# Patient Record
Sex: MALE | Race: BLACK OR AFRICAN AMERICAN | Employment: FULL TIME | URBAN - METROPOLITAN AREA
[De-identification: names, ages, dates, MRNs, and addresses within clinical notes are randomized per-mention and may not be internally consistent; named-entity substitution may affect disease eponyms.]

---

## 2021-05-12 ENCOUNTER — APPOINTMENT (OUTPATIENT)
Dept: CT IMAGING | Facility: HOSPITAL | Age: 36
End: 2021-05-12
Attending: STUDENT IN AN ORGANIZED HEALTH CARE EDUCATION/TRAINING PROGRAM
Payer: COMMERCIAL

## 2021-05-12 ENCOUNTER — HOSPITAL ENCOUNTER (OUTPATIENT)
Facility: HOSPITAL | Age: 36
Setting detail: OBSERVATION
Discharge: HOME | End: 2021-05-14
Attending: STUDENT IN AN ORGANIZED HEALTH CARE EDUCATION/TRAINING PROGRAM | Admitting: HOSPITALIST
Payer: COMMERCIAL

## 2021-05-12 ENCOUNTER — APPOINTMENT (OUTPATIENT)
Dept: GENERAL RADIOLOGY | Facility: HOSPITAL | Age: 36
End: 2021-05-12
Attending: STUDENT IN AN ORGANIZED HEALTH CARE EDUCATION/TRAINING PROGRAM
Payer: COMMERCIAL

## 2021-05-12 DIAGNOSIS — R10.13 EPIGASTRIC ABDOMINAL PAIN: ICD-10-CM

## 2021-05-12 DIAGNOSIS — K92.2 GASTROINTESTINAL HEMORRHAGE, UNSPECIFIED GASTROINTESTINAL HEMORRHAGE TYPE: Primary | ICD-10-CM

## 2021-05-12 DIAGNOSIS — R19.5 DARK STOOLS: ICD-10-CM

## 2021-05-12 DIAGNOSIS — D64.9 ANEMIA, UNSPECIFIED TYPE: ICD-10-CM

## 2021-05-12 DIAGNOSIS — S09.90XA INJURY OF HEAD, INITIAL ENCOUNTER: ICD-10-CM

## 2021-05-12 DIAGNOSIS — R55 SYNCOPE AND COLLAPSE: ICD-10-CM

## 2021-05-12 PROCEDURE — 71045 X-RAY EXAM CHEST 1 VIEW: CPT | Performed by: STUDENT IN AN ORGANIZED HEALTH CARE EDUCATION/TRAINING PROGRAM

## 2021-05-12 PROCEDURE — 99220 INITIAL OBSERVATION CARE,LEVL III: CPT | Performed by: HOSPITALIST

## 2021-05-12 PROCEDURE — 70450 CT HEAD/BRAIN W/O DYE: CPT | Performed by: STUDENT IN AN ORGANIZED HEALTH CARE EDUCATION/TRAINING PROGRAM

## 2021-05-12 RX ORDER — POLYETHYLENE GLYCOL 3350 17 G/17G
17 POWDER, FOR SOLUTION ORAL DAILY PRN
Status: DISCONTINUED | OUTPATIENT
Start: 2021-05-12 | End: 2021-05-14

## 2021-05-12 RX ORDER — ACETAMINOPHEN 325 MG/1
650 TABLET ORAL EVERY 6 HOURS PRN
Status: DISCONTINUED | OUTPATIENT
Start: 2021-05-12 | End: 2021-05-14

## 2021-05-12 RX ORDER — BISACODYL 10 MG
10 SUPPOSITORY, RECTAL RECTAL
Status: DISCONTINUED | OUTPATIENT
Start: 2021-05-12 | End: 2021-05-14

## 2021-05-12 RX ORDER — ONDANSETRON 2 MG/ML
8 INJECTION INTRAMUSCULAR; INTRAVENOUS EVERY 6 HOURS PRN
Status: DISCONTINUED | OUTPATIENT
Start: 2021-05-12 | End: 2021-05-14

## 2021-05-12 RX ORDER — CALCIUM CARBONATE 200(500)MG
1 TABLET,CHEWABLE ORAL DAILY PRN
Status: ON HOLD | COMMUNITY
End: 2021-05-14

## 2021-05-12 RX ORDER — CALCIUM CARBONATE 200(500)MG
500 TABLET,CHEWABLE ORAL DAILY PRN
Status: DISCONTINUED | OUTPATIENT
Start: 2021-05-12 | End: 2021-05-14

## 2021-05-12 RX ORDER — MELATONIN
3 NIGHTLY PRN
Status: DISCONTINUED | OUTPATIENT
Start: 2021-05-12 | End: 2021-05-14

## 2021-05-12 RX ORDER — SODIUM CHLORIDE 9 MG/ML
INJECTION, SOLUTION INTRAVENOUS CONTINUOUS
Status: DISCONTINUED | OUTPATIENT
Start: 2021-05-12 | End: 2021-05-14

## 2021-05-12 RX ORDER — PANTOPRAZOLE SODIUM 40 MG/1
INJECTION, POWDER, FOR SOLUTION INTRAVENOUS
Status: COMPLETED
Start: 2021-05-12 | End: 2021-05-12

## 2021-05-12 NOTE — ED PROVIDER NOTES
Patient Seen in: [facility] Emergency Department      History   Patient presents with:  Syncope    Stated Complaint: syncopal episode     HPI/Subjective:   HPI    Patient is a 42-year-old male who presents emergency department for evaluation followi (Temporal)   Resp 16   Ht 170.2 cm (5' 7\")   Wt 97.5 kg   SpO2 100%   BMI 33.67 kg/m²         Physical Exam    Constitutional: The patient is oriented to person, place, and time, appears well-developed and well-nourished. HENT:   Head: Normocephalic. Neutrophil Absolute Prelim 7.84 (*)     Neutrophil Absolute 7.84 (*)     Lymphocyte Absolute 4.02 (*)     All other components within normal limits   TROPONIN I - Normal   D-DIMER - Normal   PROTHROMBIN TIME (PT) - Normal   RAPID SARS-COV-2 BY PCR - Normal admit to having had melanotic stools intermittently for the last couple of months. No vomiting, no abdominal discomfort, no fever.     CT of the brain was obtained showing no acute intracranial abnormality given his head injury in the setting of the syncop

## 2021-05-12 NOTE — PLAN OF CARE
Pt admitted via cart from ER. Pt alert and oriented. Admission database completed. Pt denies pain other than mild headache from \"not eating all day\"; pt states head where he hit it when he fell does not bother him and sm bump per pt. Pt denies nausea.  Pt

## 2021-05-12 NOTE — CONSULTS
[facility]                       Gastroenterology [address] [facility] Gastroenterology    Aretha Zurita Patient Status:  Emergency    1985 MRN [id number]   Location [address] [facility] Attending Crissy Kapoor, disease    ROS:  In addition to the pertinent positives described above:             Infectious Disease:  No chronic infections or recent fevers prior to the acute illness            Cardiovascular: No history of CAD, prior MI, chest pain, or palpitations cyanosis    Skin: Warm and dry    Psychiatric: Appropriate mood and congruent affect without obvious depression or anxiety    Labs:   Lab Results   Component Value Date    WBC 13.1 05/12/2021    HGB 7.4 05/12/2021    HCT 22.3 05/12/2021    .0 05/12/ in AM transfusing to maintain Hgb >7    Thank you for the consultation, we will follow the patient with you.     CHAYITO Mckee  3:04 PM  5/12/2021  [facility] Gastroenterology  [phone]    GI attending addendum:    I have personally seen and examin

## 2021-05-12 NOTE — ED INITIAL ASSESSMENT (HPI)
Patient to ED for syncopal episode this morning after urinating. Patient states was urinating, felt hot and dizzy, sad down, stood up and then lost consciousness/fell. Patient states woke up to his roommate yelling his name.  C/o pain/bump to right side gucci

## 2021-05-12 NOTE — H&P
[facility] HOSPITALIST  History and Physical     Etelvina [place] Patient Status:  Observation    1985 MRN [id number]   [facility] 0SW-A Attending Ubaldo Freeman MD   Hosp Day # 0 PCP None Pcp     Chief Complaint: syncope    History of Present organomegaly. Neurologic: No focal neurological deficits. CNII-XII grossly intact. Musculoskeletal: Moves all extremities. Extremities: No edema or cyanosis. Integument: No rashes or lesions. Psychiatric: Appropriate mood and affect.       Diagnostic

## 2021-05-13 ENCOUNTER — ANESTHESIA EVENT (OUTPATIENT)
Dept: ENDOSCOPY | Facility: HOSPITAL | Age: 36
End: 2021-05-13
Payer: COMMERCIAL

## 2021-05-13 ENCOUNTER — ANESTHESIA (OUTPATIENT)
Dept: ENDOSCOPY | Facility: HOSPITAL | Age: 36
End: 2021-05-13
Payer: COMMERCIAL

## 2021-05-13 PROCEDURE — 30233N1 TRANSFUSION OF NONAUTOLOGOUS RED BLOOD CELLS INTO PERIPHERAL VEIN, PERCUTANEOUS APPROACH: ICD-10-PCS | Performed by: INTERNAL MEDICINE

## 2021-05-13 PROCEDURE — 99225 SUBSEQUENT OBSERVATION CARE: CPT | Performed by: HOSPITALIST

## 2021-05-13 PROCEDURE — 0DB78ZX EXCISION OF STOMACH, PYLORUS, VIA NATURAL OR ARTIFICIAL OPENING ENDOSCOPIC, DIAGNOSTIC: ICD-10-PCS | Performed by: INTERNAL MEDICINE

## 2021-05-13 RX ORDER — LIDOCAINE HYDROCHLORIDE 10 MG/ML
INJECTION, SOLUTION EPIDURAL; INFILTRATION; INTRACAUDAL; PERINEURAL AS NEEDED
Status: DISCONTINUED | OUTPATIENT
Start: 2021-05-13 | End: 2021-05-13 | Stop reason: SURG

## 2021-05-13 RX ORDER — METOCLOPRAMIDE HYDROCHLORIDE 5 MG/ML
10 INJECTION INTRAMUSCULAR; INTRAVENOUS AS NEEDED
Status: ACTIVE | OUTPATIENT
Start: 2021-05-13 | End: 2021-05-14

## 2021-05-13 RX ORDER — SODIUM CHLORIDE 9 MG/ML
INJECTION, SOLUTION INTRAVENOUS CONTINUOUS
Status: ACTIVE | OUTPATIENT
Start: 2021-05-13 | End: 2021-05-13

## 2021-05-13 RX ORDER — HYDROMORPHONE HYDROCHLORIDE 1 MG/ML
0.4 INJECTION, SOLUTION INTRAMUSCULAR; INTRAVENOUS; SUBCUTANEOUS EVERY 5 MIN PRN
Status: ACTIVE | OUTPATIENT
Start: 2021-05-13 | End: 2021-05-14

## 2021-05-13 RX ORDER — DIPHENHYDRAMINE HYDROCHLORIDE 50 MG/ML
12.5 INJECTION INTRAMUSCULAR; INTRAVENOUS AS NEEDED
Status: ACTIVE | OUTPATIENT
Start: 2021-05-13 | End: 2021-05-14

## 2021-05-13 RX ORDER — NALOXONE HYDROCHLORIDE 0.4 MG/ML
80 INJECTION, SOLUTION INTRAMUSCULAR; INTRAVENOUS; SUBCUTANEOUS AS NEEDED
Status: ACTIVE | OUTPATIENT
Start: 2021-05-13 | End: 2021-05-14

## 2021-05-13 RX ORDER — SODIUM CHLORIDE 9 MG/ML
INJECTION, SOLUTION INTRAVENOUS ONCE
Status: COMPLETED | OUTPATIENT
Start: 2021-05-13 | End: 2021-05-13

## 2021-05-13 RX ORDER — ONDANSETRON 2 MG/ML
4 INJECTION INTRAMUSCULAR; INTRAVENOUS AS NEEDED
Status: ACTIVE | OUTPATIENT
Start: 2021-05-13 | End: 2021-05-14

## 2021-05-13 RX ORDER — PANTOPRAZOLE SODIUM 40 MG/1
40 TABLET, DELAYED RELEASE ORAL
Status: DISCONTINUED | OUTPATIENT
Start: 2021-05-13 | End: 2021-05-14

## 2021-05-13 RX ORDER — SODIUM CHLORIDE, SODIUM LACTATE, POTASSIUM CHLORIDE, CALCIUM CHLORIDE 600; 310; 30; 20 MG/100ML; MG/100ML; MG/100ML; MG/100ML
INJECTION, SOLUTION INTRAVENOUS CONTINUOUS
Status: DISCONTINUED | OUTPATIENT
Start: 2021-05-13 | End: 2021-05-14

## 2021-05-13 RX ORDER — DEXTROSE MONOHYDRATE 25 G/50ML
50 INJECTION, SOLUTION INTRAVENOUS
Status: DISCONTINUED | OUTPATIENT
Start: 2021-05-13 | End: 2021-05-14

## 2021-05-13 RX ADMIN — SODIUM CHLORIDE: 9 INJECTION, SOLUTION INTRAVENOUS at 15:39:00

## 2021-05-13 RX ADMIN — LIDOCAINE HYDROCHLORIDE 50 MG: 10 INJECTION, SOLUTION EPIDURAL; INFILTRATION; INTRACAUDAL; PERINEURAL at 15:29:00

## 2021-05-13 NOTE — PROGRESS NOTES
[facility] H[facility]PITAL[facility]  Progress note     Pio Abrams Patient Status:  Observation    1985 MRN [id number]   [facility] 0SW-A Attending Milagros Blanchard MD   Hosp Day # 0 PCP None Pcp     Chief Complaint: syncope  Subjective: patient feeling 05/12/21  1134 05/12/21  1354   PREMA 45.0*  44.2*  --    DDIMER  --  <0.27       Imaging: Imaging data reviewed in Epic. ASSESSMENT / PLAN:     1. Syncope likely due to symptomatic anemia and vasovagal event  2.  Anemia, likely due to gi blood loss; LINDA

## 2021-05-13 NOTE — PLAN OF CARE
Assumed care of pt at [address] [facility]. Pt denies pain. Lungs clear. Abdomen soft non tender bs present. Pt npo post mn for procedure in am. Remains on protonix gtt and ivf. Will continue to monitor.

## 2021-05-13 NOTE — ANESTHESIA POSTPROCEDURE EVALUATION
[facility] Patient Status:  Observation   Age/Gender 37year old male MRN [id number]   Location [address] [place] [address] Attending Latia Higuera MD   Hosp Day # 0 PCP None Pcp       Anesthesia Post-op Note    ESOPHAGOGASTRO

## 2021-05-13 NOTE — ANESTHESIA PREPROCEDURE EVALUATION
PRE-OP EVALUATION    Patient Name: Lindsey David    Admit Diagnosis: Syncope and collapse [R55]  Injury of head, initial encounter [S09.90XA]  Gastrointestinal hemorrhage, unspecified gastrointestinal hemorrhage type [K92.2]  Anemia, unspecified type [D64.9] 5/11/2021 at Unknown time        Allergies: Patient has no known allergies. Anesthesia Evaluation    Patient summary reviewed.     Anesthetic Complications  (-) history of anesthetic complications         GI/Hepatic/Renal  Comment: GI bleed

## 2021-05-13 NOTE — OPERATIVE REPORT
Operative Report-Esophagogastroduodenoscopy with biopsy      PREOPERATIVE DIAGNOSIS/INDICATION:  1. Melena  2.  Acute blood loss anemia  POSTOPERTATIVE DIAGNOSIS:  1. Multiple clean based duodenal ulcers with deformity of the bulb bid  - Soft diet  - If tolerating and hemoglobin stable, ok for dc in am  - OV in 4- 6 weeks.   CC Report:     Marianela Lebron  5/13/2021  3:38 PM  None Pcp

## 2021-05-13 NOTE — PLAN OF CARE
Assumed care at 0730  A&Ox4  Denies dizziness  Denies pain  1 unit PRBCs infusing  Vitals stable  Kept NPO for EGD this afternoon  Will continue to monitor

## 2021-05-14 VITALS
BODY MASS INDEX: 33.74 KG/M2 | SYSTOLIC BLOOD PRESSURE: 114 MMHG | OXYGEN SATURATION: 100 % | HEART RATE: 68 BPM | RESPIRATION RATE: 17 BRPM | TEMPERATURE: 98 F | HEIGHT: 67 IN | DIASTOLIC BLOOD PRESSURE: 61 MMHG | WEIGHT: 215 LBS

## 2021-05-14 PROCEDURE — 99217 OBSERVATION CARE DISCHARGE: CPT | Performed by: HOSPITALIST

## 2021-05-14 RX ORDER — MELATONIN
325
Qty: 30 TABLET | Refills: 11 | Status: SHIPPED | OUTPATIENT
Start: 2021-05-14

## 2021-05-14 RX ORDER — PANTOPRAZOLE SODIUM 40 MG/1
40 TABLET, DELAYED RELEASE ORAL
Qty: 60 TABLET | Refills: 2 | Status: SHIPPED | OUTPATIENT
Start: 2021-05-14

## 2021-05-14 RX ORDER — RIZATRIPTAN BENZOATE 5 MG/1
5 TABLET, ORALLY DISINTEGRATING ORAL ONCE
Status: COMPLETED | OUTPATIENT
Start: 2021-05-14 | End: 2021-05-14

## 2021-05-14 NOTE — PLAN OF CARE
Headache pain decreased from 7/10 to 2/10 after taking maxalt. Tolerating soft diet, c/o some bloating after eating that has since passed. Belching and passing flatus. Cleared for discharge home today.  Written and verbal discharge education provided for pt

## 2021-05-14 NOTE — PROGRESS NOTES
[facility]                       Gastroenterology Follow up [address] [facility] Gastroenterology    Katelynn Bullock Patient Status:  Observation    1985 MRN [id number]   [facility] 0SW-A Attending Mayuri Parekh MD   [address] [facility] Day # 0 PC bleed secondary to multiple gastroduodenal ulcers with duodenal bulb deformity. Suspect H.pylori+ NSAIDs  Plan:   1. OK for dc today on PPI bid, iron supplements  2. Will fu on path as outpatient and treat if positive  3. Avoid NSAIDs  4.  Fu in office in 4

## 2021-05-14 NOTE — DISCHARGE SUMMARY
[facility]IATR[facility] [place] HOSPITALIST  DISCHARGE SUMMARY     Veronika Cuellar Patient Status:  Observation    1985 MRN [id number]   [facility] 0SW-A Attending No att. providers found   Hosp Day # 0 PCP None Pcp     Date of Admission: 2021  Date of [address] (two) times daily before meals.    Quantity: 60 tablet  Refills: 2        STOP taking these medications    Calcium Carbonate Antacid 500 MG Chew  Commonly known as: TUMS              Where to Get Your Medications      Please  your prescriptions at th

## 2021-05-14 NOTE — PLAN OF CARE
Assumed care at 1710 Pradeep Zheng pt on bed  Headache better with Tylenol. Denies dizziness  POC updated. Vital signs stable. Tolerating diet,will advance.   Check Hgb in am.  Problem: Patient/Family Goals  Goal: Patient/Family Long Term Goal  Description: Mare Vazquez Administer electrolyte replacement as ordered  - Monitor response to electrolyte replacements, including rhythm and repeat lab results as appropriate  - Fluid restriction as ordered  - Instruct patient on fluid and nutrition restrictions as appropriate  Ou

## 2021-05-17 ENCOUNTER — PATIENT OUTREACH (OUTPATIENT)
Dept: CASE MANAGEMENT | Age: 36
End: 2021-05-17

## 2021-05-17 ENCOUNTER — APPOINTMENT (OUTPATIENT)
Dept: GENERAL RADIOLOGY | Facility: HOSPITAL | Age: 36
End: 2021-05-17
Attending: EMERGENCY MEDICINE
Payer: COMMERCIAL

## 2021-05-17 ENCOUNTER — HOSPITAL ENCOUNTER (EMERGENCY)
Facility: HOSPITAL | Age: 36
Discharge: HOME | End: 2021-05-17
Attending: EMERGENCY MEDICINE
Payer: COMMERCIAL

## 2021-05-17 VITALS
DIASTOLIC BLOOD PRESSURE: 77 MMHG | BODY MASS INDEX: 33.9 KG/M2 | RESPIRATION RATE: 12 BRPM | WEIGHT: 216 LBS | HEART RATE: 69 BPM | OXYGEN SATURATION: 98 % | SYSTOLIC BLOOD PRESSURE: 155 MMHG | HEIGHT: 67 IN | TEMPERATURE: 98 F

## 2021-05-17 DIAGNOSIS — E87.6 HYPOKALEMIA: ICD-10-CM

## 2021-05-17 DIAGNOSIS — Z02.9 ENCOUNTERS FOR ADMINISTRATIVE PURPOSE: ICD-10-CM

## 2021-05-17 DIAGNOSIS — K27.9 PUD (PEPTIC ULCER DISEASE): Primary | ICD-10-CM

## 2021-05-17 PROCEDURE — 83690 ASSAY OF LIPASE: CPT | Performed by: EMERGENCY MEDICINE

## 2021-05-17 PROCEDURE — 80053 COMPREHEN METABOLIC PANEL: CPT | Performed by: EMERGENCY MEDICINE

## 2021-05-17 PROCEDURE — 82272 OCCULT BLD FECES 1-3 TESTS: CPT

## 2021-05-17 PROCEDURE — 99284 EMERGENCY DEPT VISIT MOD MDM: CPT

## 2021-05-17 PROCEDURE — 71045 X-RAY EXAM CHEST 1 VIEW: CPT | Performed by: EMERGENCY MEDICINE

## 2021-05-17 PROCEDURE — 93005 ELECTROCARDIOGRAM TRACING: CPT

## 2021-05-17 PROCEDURE — 36415 COLL VENOUS BLD VENIPUNCTURE: CPT

## 2021-05-17 PROCEDURE — 84484 ASSAY OF TROPONIN QUANT: CPT | Performed by: EMERGENCY MEDICINE

## 2021-05-17 PROCEDURE — 85025 COMPLETE CBC W/AUTO DIFF WBC: CPT | Performed by: EMERGENCY MEDICINE

## 2021-05-17 PROCEDURE — 93010 ELECTROCARDIOGRAM REPORT: CPT

## 2021-05-17 RX ORDER — POTASSIUM CHLORIDE 20 MEQ/1
40 TABLET, EXTENDED RELEASE ORAL ONCE
Status: COMPLETED | OUTPATIENT
Start: 2021-05-17 | End: 2021-05-17

## 2021-05-17 NOTE — ED INITIAL ASSESSMENT (HPI)
38 yo M, whop had hx of bleeding ulcer, had recent blood transfusion brought by EMS complaining of epigastric/chest tightness and difficulty of breathing after eating peanut butter and jelly.  He states when he sleeps if feels like he stopped breathing and

## 2021-05-17 NOTE — ED PROVIDER NOTES
Patient Seen in: [facility] Emergency Department      History   Patient presents with:   Anxiety/Panic attack    Stated Complaint: anxiety, chest discomfort    HPI/Subjective:   HPI    68-year-old gentleman just discharged from the hospital on Friday Resp 12   Ht 170.2 cm (5' 7\")   Wt 98 kg   SpO2 98%   BMI 33.83 kg/m²         Physical Exam  Vitals and nursing note reviewed. Constitutional:       General: He is not in acute distress. Appearance: He is well-developed. He is not toxic-appearing. DIFFERENTIAL[[id number]]          Abnormal            Final result                 Please view results for these tests on the individual orders.    RAINBOW DRAW LAVENDER   RAINBOW DRAW LIGHT GREEN   RAINBOW DRAW GOLD     AP CHEST RADIOGRAPH at 2:03 AM    Com be discharged home with outpatient follow-up.   Advised to continue current regimen for PUD                     Disposition and Plan     Clinical Impression:  PUD (peptic ulcer disease)  (primary encounter diagnosis)  Hypokalemia     Disposition:  Discharge

## 2021-05-17 NOTE — PAYOR COMM NOTE
Discharge Notification    Patient Name: Estefani Garvin: Gasper Helms  #: [id number]  Authorization Number:  [id number]  Admit Date/Time: 5/12/2021 10:39 AM  Discharge Date/Time: 5/14/2021 1:15 PM

## 2021-05-18 ENCOUNTER — OFFICE VISIT (OUTPATIENT)
Dept: INTERNAL MEDICINE CLINIC | Facility: CLINIC | Age: 36
End: 2021-05-18
Payer: COMMERCIAL

## 2021-05-18 VITALS
SYSTOLIC BLOOD PRESSURE: 128 MMHG | HEIGHT: 67 IN | RESPIRATION RATE: 16 BRPM | TEMPERATURE: 98 F | HEART RATE: 113 BPM | DIASTOLIC BLOOD PRESSURE: 62 MMHG | WEIGHT: 215 LBS | BODY MASS INDEX: 33.74 KG/M2 | OXYGEN SATURATION: 98 %

## 2021-05-18 DIAGNOSIS — R73.9 HYPERGLYCEMIA: ICD-10-CM

## 2021-05-18 DIAGNOSIS — D64.9 ANEMIA, UNSPECIFIED TYPE: ICD-10-CM

## 2021-05-18 DIAGNOSIS — E87.6 HYPOKALEMIA: Primary | ICD-10-CM

## 2021-05-18 DIAGNOSIS — K92.2 GASTROINTESTINAL HEMORRHAGE, UNSPECIFIED GASTROINTESTINAL HEMORRHAGE TYPE: ICD-10-CM

## 2021-05-18 DIAGNOSIS — R55 SYNCOPE AND COLLAPSE: ICD-10-CM

## 2021-05-18 DIAGNOSIS — F17.200 TOBACCO DEPENDENCE: ICD-10-CM

## 2021-05-18 DIAGNOSIS — K27.9 PUD (PEPTIC ULCER DISEASE): ICD-10-CM

## 2021-05-18 PROCEDURE — 80048 BASIC METABOLIC PNL TOTAL CA: CPT | Performed by: NURSE PRACTITIONER

## 2021-05-18 PROCEDURE — 83036 HEMOGLOBIN GLYCOSYLATED A1C: CPT | Performed by: NURSE PRACTITIONER

## 2021-05-18 PROCEDURE — 3078F DIAST BP <80 MM HG: CPT | Performed by: NURSE PRACTITIONER

## 2021-05-18 PROCEDURE — 99495 TRANSJ CARE MGMT MOD F2F 14D: CPT | Performed by: NURSE PRACTITIONER

## 2021-05-18 PROCEDURE — 3074F SYST BP LT 130 MM HG: CPT | Performed by: NURSE PRACTITIONER

## 2021-05-18 PROCEDURE — 3008F BODY MASS INDEX DOCD: CPT | Performed by: NURSE PRACTITIONER

## 2021-05-18 RX ORDER — BLOOD-GLUCOSE METER
1 EACH MISCELLANEOUS 2 TIMES DAILY
Qty: 1 KIT | Refills: 0 | Status: SHIPPED | OUTPATIENT
Start: 2021-05-18 | End: 2021-05-25 | Stop reason: CLARIF

## 2021-05-18 RX ORDER — ACETAMINOPHEN 500 MG
TABLET ORAL EVERY 6 HOURS PRN
COMMUNITY

## 2021-05-18 RX ORDER — BLOOD SUGAR DIAGNOSTIC
STRIP MISCELLANEOUS
Qty: 50 STRIP | Refills: 0 | Status: SHIPPED | OUTPATIENT
Start: 2021-05-18

## 2021-05-18 RX ORDER — LANCETS 33 GAUGE
1 EACH MISCELLANEOUS 2 TIMES DAILY
Qty: 100 EACH | Refills: 0 | Status: SHIPPED | OUTPATIENT
Start: 2021-05-18

## 2021-05-18 RX ORDER — POTASSIUM CHLORIDE 1500 MG/1
2 TABLET, FILM COATED, EXTENDED RELEASE ORAL 2 TIMES DAILY
Qty: 4 TABLET | Refills: 0 | Status: SHIPPED | OUTPATIENT
Start: 2021-05-18 | End: 2021-05-25 | Stop reason: CLARIF

## 2021-05-18 NOTE — PROGRESS NOTES
[facility]      [facility]ORY   CHIEF COMPLAINT: Syncope, PUD, GI bleeding, anemia, hypokalemia, and hyperglycemia.      HPI: Bess Wallace is a 37year old male here today for hospital follow up for Syncope, PUD, GI bleedi hours as needed for Pain., Disp: , Rfl:   ferrous sulfate 325 (65 FE) MG Oral Tab EC, Take 1 tablet (325 mg total) by mouth daily with breakfast., Disp: 30 tablet, Rfl: 11  Pantoprazole Sodium 40 MG Oral Tab EC, Take 1 tablet (40 mg total) by mouth 2 (two) 05/18/2021 11:03 AM    MG 2.3 05/13/2021 05:19 AM    PHOS 2.6 05/13/2021 05:19 AM    ALB 3.5 05/17/2021 01:37 AM    ALT 31 05/17/2021 01:37 AM    AST 26 05/17/2021 01:37 AM    INR 1.11 05/12/2021 01:54 PM         There is no immunization history on file fo Colette Adam in 5 weeks-patient was instructed to make appointment  · Pathology still pending  · All hospital records, labs, and radiology reviewed    2.  Gastrointestinal hemorrhage, unspecified gastrointestinal hemorrhage type  · Secondary to PUD  · S/P bl VERIO) w/Device Does not apply Kit          Si strip by Does not apply route 2 (two) times a day. May substitute any brand covered by insurance.           Dispense:  1 kit          Refill:  0      Glucose Blood (ONETOUCH VERIO) In Vitro Strip          S living. ? Referrals as listed below in orders Assisted in scheduling required follow-up with community providers and services. Medication Reconciliation:  I am aware of an inpatient discharge within the last 30 days.   The discharge medication list has

## 2021-05-18 NOTE — PROGRESS NOTES
TRANSITIONAL CARE CLINIC PHARMACIST MEDICATION RECONCILIATION        Atul Wheeler MRN [id number]    1985 PCP None Pcp       Comments: Medication history completed by the [address] [place] [address] Pharmacist with the patient in the office.      The follo

## 2021-05-18 NOTE — PATIENT INSTRUCTIONS
Patient Instructions:    1. Call the GI Doctor to schedule an appointment.   Kesha Obando MD  [address] [phone]      Diabetes: Understanding Carbohydrates, Fats, and Protein  Food is a source of fuel and nourishment oils. They are found in avocados and some nuts. Monounsaturated fats are healthy for your heart. That’s because they lower LDL (unhealthy) cholesterol. · Polyunsaturated fats. These are mostly found in vegetable oils, such as corn, safflower, and soybean o your blood is called high blood sugar (hyperglycemia). This can lead to a dangerous condition called ketoacidosis. In severe cases, it can lead to fluid loss (dehydration) and coma.    Possible causes of high blood sugar   · Having a poor treatment plan for meal plan. · Stick to your exercise plan. · Take your insulin or diabetes medicines as directed by your healthcare team. Also test your blood sugar as directed. If the plan is not working for you, discuss it with your healthcare provider.   Other things t more of these symptoms:  · Shakiness or dizziness  · Cold, clammy skin or sweating  · Feeling hungry  · Headache  · Nervousness  · A hard, fast heartbeat  · Weakness  · Confusion or irritability  · Blurred eyesight  · Having nightmares or waking up confuse condition where you no longer recognize the symptoms of low blood sugar until the value falls to dangerous levels. Other things to do  Other tips include:  · Carry a medical ID card or wear a medical alert bracelet or necklace.  It should say that you have

## 2021-05-19 PROBLEM — F17.200 TOBACCO DEPENDENCE: Status: ACTIVE | Noted: 2021-05-19

## 2021-05-25 ENCOUNTER — OFFICE VISIT (OUTPATIENT)
Dept: INTERNAL MEDICINE CLINIC | Facility: CLINIC | Age: 36
End: 2021-05-25
Payer: COMMERCIAL

## 2021-05-25 VITALS
OXYGEN SATURATION: 100 % | DIASTOLIC BLOOD PRESSURE: 76 MMHG | HEART RATE: 74 BPM | WEIGHT: 213 LBS | HEIGHT: 67 IN | SYSTOLIC BLOOD PRESSURE: 118 MMHG | BODY MASS INDEX: 33.43 KG/M2 | RESPIRATION RATE: 16 BRPM | TEMPERATURE: 98 F

## 2021-05-25 DIAGNOSIS — A04.8 H. PYLORI INFECTION: ICD-10-CM

## 2021-05-25 DIAGNOSIS — K92.2 GASTROINTESTINAL HEMORRHAGE, UNSPECIFIED GASTROINTESTINAL HEMORRHAGE TYPE: ICD-10-CM

## 2021-05-25 DIAGNOSIS — E87.6 HYPOKALEMIA: ICD-10-CM

## 2021-05-25 DIAGNOSIS — E11.9 NEW ONSET TYPE 2 DIABETES MELLITUS (HCC): Primary | ICD-10-CM

## 2021-05-25 DIAGNOSIS — D50.0 IRON DEFICIENCY ANEMIA DUE TO CHRONIC BLOOD LOSS: ICD-10-CM

## 2021-05-25 PROBLEM — IMO0002 NEW ONSET TYPE 1 DIABETES MELLITUS, UNCONTROLLED: Status: RESOLVED | Noted: 2021-05-25 | Resolved: 2021-05-25

## 2021-05-25 PROBLEM — E10.65 NEW ONSET TYPE 1 DIABETES MELLITUS, UNCONTROLLED (HCC): Status: RESOLVED | Noted: 2021-05-25 | Resolved: 2021-05-25

## 2021-05-25 PROBLEM — E10.65 NEW ONSET TYPE 1 DIABETES MELLITUS, UNCONTROLLED (HCC): Status: ACTIVE | Noted: 2021-05-25

## 2021-05-25 PROBLEM — IMO0002 NEW ONSET TYPE 1 DIABETES MELLITUS, UNCONTROLLED: Status: ACTIVE | Noted: 2021-05-25

## 2021-05-25 PROCEDURE — 3008F BODY MASS INDEX DOCD: CPT | Performed by: NURSE PRACTITIONER

## 2021-05-25 PROCEDURE — 85025 COMPLETE CBC W/AUTO DIFF WBC: CPT | Performed by: NURSE PRACTITIONER

## 2021-05-25 PROCEDURE — 80048 BASIC METABOLIC PNL TOTAL CA: CPT | Performed by: NURSE PRACTITIONER

## 2021-05-25 PROCEDURE — 3074F SYST BP LT 130 MM HG: CPT | Performed by: NURSE PRACTITIONER

## 2021-05-25 PROCEDURE — 3078F DIAST BP <80 MM HG: CPT | Performed by: NURSE PRACTITIONER

## 2021-05-25 PROCEDURE — 99213 OFFICE O/P EST LOW 20 MIN: CPT | Performed by: NURSE PRACTITIONER

## 2021-05-25 NOTE — PROGRESS NOTES
TRANSITIONAL CARE CLINIC PHARMACIST MEDICATION RECONCILIATION        Etelvina Blanchard MRN [id number]    1985 PCP None Pcp       Comments: Medication history completed by the [address] [place] [address] Pharmacist with the patient in the office for follow-up. Reviewed with him to avoid any alcohol while on the Metronidazole, and to avoid dairy/iron products within 2 hours of taking the Doxycycline.   Patient was unaware that the Pantoprazole and Omeprazole were the same, so he will stop the Pantoprazole while he

## 2021-05-25 NOTE — PATIENT INSTRUCTIONS
PATIENT INSTRUCTIONS:    1.  Pepto Bismol tablets from the pharmacy - Take 2 tablets (524mg) three times a day for 14 days. 2.  Continue to take the Metformin 500mg one time a day.

## 2021-05-25 NOTE — PROGRESS NOTES
[address] [facility] FOLLOW-UP VISIT     Tanya Carlson MRN [id number]    1985 PCP None Pcp   Admit Date: 21  Discharge Date: 21  Hospital Discharge Diagnosis: Syncope due to vasovagal event   Blood loss anemia   PUD       CHIEF COMPLA 0  acetaminophen 500 MG Oral Tab, Take 500-1,000 mg by mouth every 6 (six) hours as needed for Pain., Disp: , Rfl:   Potassium Chloride ER 20 MEQ Oral Tab CR, Take 2 tablets by mouth 2 (two) times a day. Take 2 tablets now, then take 2 tablets in 4 hours. , AM    AST 26 05/17/2021 01:37 AM    INR 1.11 05/12/2021 01:54 PM    A1C 6.9 (H) 05/18/2021 11:03 AM       ROS:  GENERAL: weight stable, energy stable, denies fever, weakness  SKIN: denies any skin changes  EYES: denies blurred vision or double vision, eye x 14 days  · Flagyl BID  · Doxycycline BID  · Bisthmus TID  · Omeprazole BID  · No alcohol during this time  · Repeat H. Pylori breath test in 6 weeks to re-evaluate  · Patient needs to follow-up with GI to discuss further recommendations    Gastrointestin (Patient not taking: Reported on 5/25/2021 )  Pantoprazole Sodium 40 MG Oral Tab EC, Take 1 tablet (40 mg total) by mouth 2 (two) times daily before meals.  (Patient not taking: Reported on 5/25/2021 )    No current facility-administered medications on file

## 2021-05-27 ENCOUNTER — TELEPHONE (OUTPATIENT)
Dept: ENDOCRINOLOGY CLINIC | Facility: CLINIC | Age: 36
End: 2021-05-27

## 2021-06-02 ENCOUNTER — OFFICE VISIT (OUTPATIENT)
Dept: INTERNAL MEDICINE CLINIC | Facility: CLINIC | Age: 36
End: 2021-06-02
Payer: COMMERCIAL

## 2021-06-02 ENCOUNTER — TELEPHONE (OUTPATIENT)
Dept: INTERNAL MEDICINE CLINIC | Facility: CLINIC | Age: 36
End: 2021-06-02

## 2021-06-02 VITALS
HEART RATE: 60 BPM | WEIGHT: 216 LBS | HEIGHT: 67.72 IN | BODY MASS INDEX: 33.12 KG/M2 | TEMPERATURE: 98 F | SYSTOLIC BLOOD PRESSURE: 122 MMHG | DIASTOLIC BLOOD PRESSURE: 60 MMHG | OXYGEN SATURATION: 98 % | RESPIRATION RATE: 16 BRPM

## 2021-06-02 DIAGNOSIS — E66.9 CLASS 1 OBESITY: ICD-10-CM

## 2021-06-02 DIAGNOSIS — A04.8 H. PYLORI INFECTION: ICD-10-CM

## 2021-06-02 DIAGNOSIS — E11.9 NEW ONSET TYPE 2 DIABETES MELLITUS (HCC): Primary | ICD-10-CM

## 2021-06-02 PROCEDURE — 3074F SYST BP LT 130 MM HG: CPT | Performed by: INTERNAL MEDICINE

## 2021-06-02 PROCEDURE — 3078F DIAST BP <80 MM HG: CPT | Performed by: INTERNAL MEDICINE

## 2021-06-02 PROCEDURE — 99204 OFFICE O/P NEW MOD 45 MIN: CPT | Performed by: INTERNAL MEDICINE

## 2021-06-02 PROCEDURE — 3008F BODY MASS INDEX DOCD: CPT | Performed by: INTERNAL MEDICINE

## 2021-06-02 NOTE — TELEPHONE ENCOUNTER
Future Appointments   Date Time Provider [place] White   8/27/2021 11:20 AM Baudilio Guerra MD [facility]     Patient is scheduled for Annual Physical.  Please place orders with THE [facility]. Patient aware to fast.  No call back required.   Patient i

## 2021-06-02 NOTE — PROGRESS NOTES
Pio Abrams  7/7/1985    Patient presents with:  Hospital F/U: RG rm 1 New pt HFU 5/12/21 PUD      SUBJECTIVE   Pio Abrams is a 37year old male who presents as a hospital follow-up.     The patient experienced a 3 to 5-day history of intermittent abdominal each 0   • ferrous sulfate 325 (65 FE) MG Oral Tab EC Take 1 tablet (325 mg total) by mouth daily with breakfast. 30 tablet 11   • Pantoprazole Sodium 40 MG Oral Tab EC Take 1 tablet (40 mg total) by mouth 2 (two) times daily before meals.  (Patient not katlyn presents as a hospital follow-up.     New-onset DM2:  Variable FBS: 130-170  Increase metformin 500 mg daily to metformin  mg daily  HbA1c of 6.9%, however completed within one week of 1 unit PRBC transfusion  Repeat laboratory evaluation in 3 months;

## 2021-06-05 ENCOUNTER — PATIENT MESSAGE (OUTPATIENT)
Dept: INTERNAL MEDICINE CLINIC | Facility: CLINIC | Age: 36
End: 2021-06-05

## 2021-06-07 RX ORDER — METFORMIN HYDROCHLORIDE 750 MG/1
750 TABLET, EXTENDED RELEASE ORAL
Qty: 90 TABLET | Refills: 0 | Status: SHIPPED | OUTPATIENT
Start: 2021-06-07 | End: 2021-09-10

## 2021-06-07 NOTE — TELEPHONE ENCOUNTER
AD would you like #90 with repeat testing to be done ine 3 months for follow up? Medication pended.      LOV 6/2/2021  New-onset DM2:  Variable FBS: 130-170  Increase metformin 500 mg daily to metformin  mg daily  HbA1c of 6.9%, however completed with

## 2021-06-07 NOTE — TELEPHONE ENCOUNTER
From: Nalini Jarrett  To: Viktoriya Whitt MD  Sent: 6/5/2021 7:36 PM CDT  Subject: Prescription Question    Kwabena Mckeon  I was just checking to see if you were still switching me to 750mg extended release metformin or was I staying on the 500.  [facility] didn[facility]t have

## 2021-07-28 NOTE — TELEPHONE ENCOUNTER
Bloodwork orders placed to THE [facility] lab for upcoming physical per protocol.

## 2021-09-10 RX ORDER — METFORMIN HYDROCHLORIDE 750 MG/1
TABLET, EXTENDED RELEASE ORAL
Qty: 90 TABLET | Refills: 0 | Status: SHIPPED | OUTPATIENT
Start: 2021-09-10 | End: 2022-01-24

## 2021-09-10 NOTE — TELEPHONE ENCOUNTER
Been Following AD  Last OV 6/2/21  Last CPE N/A  Last Labs BMP, CBC 5/25/21    Last Rx fill Metformin ER 750mg #90 0R 6/7/21    No future appointments. Per PROTOCOL failed, urine microalb/creat due    Please Approve or Deny.

## 2022-01-24 RX ORDER — METFORMIN HYDROCHLORIDE 750 MG/1
TABLET, EXTENDED RELEASE ORAL
Qty: 90 TABLET | Refills: 0 | Status: SHIPPED | OUTPATIENT
Start: 2022-01-24

## 2022-01-24 NOTE — TELEPHONE ENCOUNTER
Last VISIT 06/02/21    Last CPE Not on file    Last REFILL 09/10/21 qty 90 w/0 refills    Last LABS 05/25/21 CBC, BMP done     No future appointments. Per PROTOCOL? Failed    Please Approve or Deny.

## 2022-06-15 RX ORDER — METFORMIN HYDROCHLORIDE 750 MG/1
750 TABLET, EXTENDED RELEASE ORAL
Qty: 14 TABLET | Refills: 0 | Status: SHIPPED | OUTPATIENT
Start: 2022-06-15 | End: 2022-06-29

## 2022-06-15 NOTE — TELEPHONE ENCOUNTER
Last VISIT 06/02/21    Last CPE  Not on file    Last REFILL 01/24/22 qty 90 w/0 refills    Last LABS 05/25/21 CBC, BMP done    No future appointments. Per PROTOCOL? Failed       Please Approve or Deny.

## (undated) DEVICE — FORCEP BIOPSY RJ4 LG CAP W/ND

## (undated) DEVICE — 1200CC GUARDIAN II: Brand: GUARDIAN

## (undated) DEVICE — Device: Brand: DEFENDO AIR/WATER/SUCTION AND BIOPSY VALVE

## (undated) DEVICE — FILTERLINE NASAL ADULT O2/CO2

## (undated) DEVICE — ENDOSCOPY PACK UPPER: Brand: MEDLINE INDUSTRIES, INC.

## (undated) DEVICE — 3M™ RED DOT™ MONITORING ELECTRODE WITH FOAM TAPE AND STICKY GEL, 50/BAG, 20/CASE, 72/PLT 2570: Brand: RED DOT™

## (undated) NOTE — LETTER
09/18/21        [address]acio [address]      Dear Kirsty Calderon,    [address] [facility] records indicate that you have outstanding lab work and or testing that was ordered for you and has not yet been completed:  Orders Placed This Encounter

## (undated) NOTE — LETTER
Devika Valdovinos [address]  [address] [facility] [address], [address] [facility]  Authorization for Surgical Operation and Procedure     Date:___________                                                                                                         Time:__________ blood products. The following are some, but not all, of the potential risks that can occur: fever and allergic reactions, hemolytic reactions, transmission of diseases such as Hepatitis, AIDS and Cytomegalovirus (CMV) and fluid overload.   In the event phi consent on my behalf). The surgeon or my attending physician will determine when the applicable recovery period ends for purposes of reinstating the DNAR order.   10. Patients having a sterilization procedure: I understand that if the procedure is successfu agree to:  a. Allow the anesthesiologist (anesthesia doctor) to give me medicine and do additional procedures as necessary.  Some examples are: Starting or using an “IV” to give me medicine, fluids or blood during my procedure, and having a breathing tube p Anesthesia (“spinal”, “epidural”, & “nerve blocks”): I understand that rare but potential complications include headache, bleeding, infection, seizure, irregular heart rhythms, and nerve injury.     I can change my mind about having anesthesia services at